# Patient Record
Sex: FEMALE | Race: WHITE | URBAN - METROPOLITAN AREA
[De-identification: names, ages, dates, MRNs, and addresses within clinical notes are randomized per-mention and may not be internally consistent; named-entity substitution may affect disease eponyms.]

---

## 2017-06-16 ENCOUNTER — OUTPATIENT (OUTPATIENT)
Dept: OUTPATIENT SERVICES | Facility: HOSPITAL | Age: 77
LOS: 1 days | Discharge: HOME | End: 2017-06-16

## 2017-06-28 DIAGNOSIS — Z01.818 ENCOUNTER FOR OTHER PREPROCEDURAL EXAMINATION: ICD-10-CM

## 2017-06-28 DIAGNOSIS — N84.0 POLYP OF CORPUS UTERI: ICD-10-CM

## 2017-06-30 ENCOUNTER — OUTPATIENT (OUTPATIENT)
Dept: OUTPATIENT SERVICES | Facility: HOSPITAL | Age: 77
LOS: 1 days | Discharge: HOME | End: 2017-06-30

## 2017-07-06 DIAGNOSIS — E78.00 PURE HYPERCHOLESTEROLEMIA, UNSPECIFIED: ICD-10-CM

## 2017-07-06 DIAGNOSIS — N84.0 POLYP OF CORPUS UTERI: ICD-10-CM

## 2017-07-06 DIAGNOSIS — N85.8 OTHER SPECIFIED NONINFLAMMATORY DISORDERS OF UTERUS: ICD-10-CM

## 2017-07-06 DIAGNOSIS — I10 ESSENTIAL (PRIMARY) HYPERTENSION: ICD-10-CM

## 2017-07-06 DIAGNOSIS — Z85.3 PERSONAL HISTORY OF MALIGNANT NEOPLASM OF BREAST: ICD-10-CM

## 2017-07-15 PROBLEM — Z00.00 ENCOUNTER FOR PREVENTIVE HEALTH EXAMINATION: Status: ACTIVE | Noted: 2017-07-15

## 2017-07-18 ENCOUNTER — OUTPATIENT (OUTPATIENT)
Dept: OUTPATIENT SERVICES | Facility: HOSPITAL | Age: 77
LOS: 1 days | Discharge: HOME | End: 2017-07-18

## 2017-07-18 DIAGNOSIS — R19.00 INTRA-ABDOMINAL AND PELVIC SWELLING, MASS AND LUMP, UNSPECIFIED SITE: ICD-10-CM

## 2017-07-21 ENCOUNTER — OUTPATIENT (OUTPATIENT)
Dept: OUTPATIENT SERVICES | Facility: HOSPITAL | Age: 77
LOS: 1 days | Discharge: HOME | End: 2017-07-21

## 2017-07-21 ENCOUNTER — APPOINTMENT (OUTPATIENT)
Dept: GYNECOLOGIC ONCOLOGY | Facility: CLINIC | Age: 77
End: 2017-07-21

## 2017-07-21 VITALS
RESPIRATION RATE: 14 BRPM | WEIGHT: 114 LBS | TEMPERATURE: 96.7 F | SYSTOLIC BLOOD PRESSURE: 157 MMHG | HEIGHT: 59 IN | DIASTOLIC BLOOD PRESSURE: 79 MMHG | BODY MASS INDEX: 22.98 KG/M2

## 2017-07-21 DIAGNOSIS — R93.5 ABNORMAL FINDINGS ON DIAGNOSTIC IMAGING OF OTHER ABDOMINAL REGIONS, INCLUDING RETROPERITONEUM: ICD-10-CM

## 2017-07-24 DIAGNOSIS — R93.8 ABNORMAL FINDINGS ON DIAGNOSTIC IMAGING OF OTHER SPECIFIED BODY STRUCTURES: ICD-10-CM

## 2017-10-17 ENCOUNTER — OUTPATIENT (OUTPATIENT)
Dept: OUTPATIENT SERVICES | Facility: HOSPITAL | Age: 77
LOS: 1 days | Discharge: HOME | End: 2017-10-17

## 2017-10-17 DIAGNOSIS — R19.00 INTRA-ABDOMINAL AND PELVIC SWELLING, MASS AND LUMP, UNSPECIFIED SITE: ICD-10-CM

## 2017-11-10 ENCOUNTER — APPOINTMENT (OUTPATIENT)
Dept: GYNECOLOGIC ONCOLOGY | Facility: CLINIC | Age: 77
End: 2017-11-10

## 2018-01-15 ENCOUNTER — OUTPATIENT (OUTPATIENT)
Dept: OUTPATIENT SERVICES | Facility: HOSPITAL | Age: 78
LOS: 1 days | Discharge: HOME | End: 2018-01-15

## 2018-01-15 DIAGNOSIS — R22.0 LOCALIZED SWELLING, MASS AND LUMP, HEAD: ICD-10-CM

## 2018-07-20 ENCOUNTER — EMERGENCY (EMERGENCY)
Facility: HOSPITAL | Age: 78
LOS: 0 days | Discharge: HOME | End: 2018-07-21
Admitting: SURGERY

## 2018-07-20 ENCOUNTER — INPATIENT (INPATIENT)
Facility: HOSPITAL | Age: 78
LOS: 0 days | Discharge: HOME | End: 2018-07-21
Attending: SURGERY | Admitting: SURGERY

## 2018-07-20 VITALS
TEMPERATURE: 97 F | SYSTOLIC BLOOD PRESSURE: 167 MMHG | RESPIRATION RATE: 18 BRPM | OXYGEN SATURATION: 97 % | HEART RATE: 98 BPM | DIASTOLIC BLOOD PRESSURE: 79 MMHG

## 2018-07-20 VITALS — WEIGHT: 115.96 LBS | HEIGHT: 59 IN

## 2018-07-20 DIAGNOSIS — Z90.10 ACQUIRED ABSENCE OF UNSPECIFIED BREAST AND NIPPLE: Chronic | ICD-10-CM
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
## 2018-07-20 DIAGNOSIS — S22.42XA MULTIPLE FRACTURES OF RIBS, LEFT SIDE, INITIAL ENCOUNTER FOR CLOSED FRACTURE: ICD-10-CM

## 2018-07-20 DIAGNOSIS — S01.81XA LACERATION WITHOUT FOREIGN BODY OF OTHER PART OF HEAD, INITIAL ENCOUNTER: ICD-10-CM

## 2018-07-20 DIAGNOSIS — Z85.3 PERSONAL HISTORY OF MALIGNANT NEOPLASM OF BREAST: ICD-10-CM

## 2018-07-20 DIAGNOSIS — I10 ESSENTIAL (PRIMARY) HYPERTENSION: ICD-10-CM
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
## 2018-07-20 DIAGNOSIS — M21.379 FOOT DROP, UNSPECIFIED FOOT: ICD-10-CM

## 2018-07-20 DIAGNOSIS — E78.00 PURE HYPERCHOLESTEROLEMIA, UNSPECIFIED: ICD-10-CM
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
## 2018-07-20 DIAGNOSIS — R07.82 INTERCOSTAL PAIN: ICD-10-CM

## 2018-07-20 LAB
ANION GAP SERPL CALC-SCNC: 18 MMOL/L — HIGH (ref 7–14)
BASOPHILS # BLD AUTO: 0.03 K/UL — SIGNIFICANT CHANGE UP (ref 0–0.2)
BASOPHILS NFR BLD AUTO: 0.3 % — SIGNIFICANT CHANGE UP (ref 0–1)
BUN SERPL-MCNC: 18 MG/DL — SIGNIFICANT CHANGE UP (ref 10–20)
CALCIUM SERPL-MCNC: 9.4 MG/DL — SIGNIFICANT CHANGE UP (ref 8.5–10.1)
CHLORIDE SERPL-SCNC: 99 MMOL/L — SIGNIFICANT CHANGE UP (ref 98–110)
CK MB CFR SERPL CALC: 19.5 NG/ML — HIGH (ref 0.6–6.3)
CK MB CFR SERPL CALC: 22.7 NG/ML — HIGH (ref 0.6–6.3)
CK SERPL-CCNC: 273 U/L — HIGH (ref 0–225)
CK SERPL-CCNC: 302 U/L — HIGH (ref 0–225)
CO2 SERPL-SCNC: 26 MMOL/L — SIGNIFICANT CHANGE UP (ref 17–32)
CREAT SERPL-MCNC: 0.8 MG/DL — SIGNIFICANT CHANGE UP (ref 0.7–1.5)
EOSINOPHIL # BLD AUTO: 0.01 K/UL — SIGNIFICANT CHANGE UP (ref 0–0.7)
EOSINOPHIL NFR BLD AUTO: 0.1 % — SIGNIFICANT CHANGE UP (ref 0–8)
GLUCOSE SERPL-MCNC: 93 MG/DL — SIGNIFICANT CHANGE UP (ref 70–99)
HCT VFR BLD CALC: 43.5 % — SIGNIFICANT CHANGE UP (ref 37–47)
HGB BLD-MCNC: 14.4 G/DL — SIGNIFICANT CHANGE UP (ref 12–16)
IMM GRANULOCYTES NFR BLD AUTO: 0.3 % — SIGNIFICANT CHANGE UP (ref 0.1–0.3)
LYMPHOCYTES # BLD AUTO: 0.74 K/UL — LOW (ref 1.2–3.4)
LYMPHOCYTES # BLD AUTO: 8.3 % — LOW (ref 20.5–51.1)
MCHC RBC-ENTMCNC: 30.6 PG — SIGNIFICANT CHANGE UP (ref 27–31)
MCHC RBC-ENTMCNC: 33.1 G/DL — SIGNIFICANT CHANGE UP (ref 32–37)
MCV RBC AUTO: 92.6 FL — SIGNIFICANT CHANGE UP (ref 81–99)
MONOCYTES # BLD AUTO: 0.51 K/UL — SIGNIFICANT CHANGE UP (ref 0.1–0.6)
MONOCYTES NFR BLD AUTO: 5.7 % — SIGNIFICANT CHANGE UP (ref 1.7–9.3)
NEUTROPHILS # BLD AUTO: 7.58 K/UL — HIGH (ref 1.4–6.5)
NEUTROPHILS NFR BLD AUTO: 85.3 % — HIGH (ref 42.2–75.2)
NRBC # BLD: 0 /100 WBCS — SIGNIFICANT CHANGE UP (ref 0–0)
PLATELET # BLD AUTO: 168 K/UL — SIGNIFICANT CHANGE UP (ref 130–400)
POTASSIUM SERPL-MCNC: 4.2 MMOL/L — SIGNIFICANT CHANGE UP (ref 3.5–5)
POTASSIUM SERPL-SCNC: 4.2 MMOL/L — SIGNIFICANT CHANGE UP (ref 3.5–5)
RBC # BLD: 4.7 M/UL — SIGNIFICANT CHANGE UP (ref 4.2–5.4)
RBC # FLD: 12.4 % — SIGNIFICANT CHANGE UP (ref 11.5–14.5)
SODIUM SERPL-SCNC: 143 MMOL/L — SIGNIFICANT CHANGE UP (ref 135–146)
TROPONIN T SERPL-MCNC: 0.02 NG/ML — HIGH
TROPONIN T SERPL-MCNC: <0.01 NG/ML — SIGNIFICANT CHANGE UP
WBC # BLD: 8.9 K/UL — SIGNIFICANT CHANGE UP (ref 4.8–10.8)
WBC # FLD AUTO: 8.9 K/UL — SIGNIFICANT CHANGE UP (ref 4.8–10.8)

## 2018-07-20 NOTE — ED ADULT TRIAGE NOTE - CHIEF COMPLAINT QUOTE
BIBA s/p trip and fall PTA. + laceration to left forehead, left knee abrasian No LOC, no blood thinners.

## 2018-07-20 NOTE — H&P ADULT - NSHPPHYSICALEXAM_GEN_ALL_CORE
T(C): 36.3 (20 Jul 2018 15:13), Max: 36.3 (20 Jul 2018 15:13)  T(F): 97.4 (20 Jul 2018 15:13), Max: 97.4 (20 Jul 2018 15:13)  HR: 98 (20 Jul 2018 15:13) (98 - 98)  BP: 167/79 (20 Jul 2018 15:13) (167/79 - 167/79)  RR: 18 (20 Jul 2018 15:13) (18 - 18)  SpO2: 97% (20 Jul 2018 15:13) (97% - 97%)    General: NAD, AAOx3, calm and cooperative, road rashes are persant on left knee, forehead  HEENT: NCAT, BOOKER, EOMI, Trachea ML  Cardiac: RRR S1, S2, no Murmurs, rubs or gallops  Respiratory: CTAB, normal respiratory effort, breath sounds equal BL, tendernesss present on l ant chest  Abdomen: Soft, non-distended, non-tender, +bowel sounds  Musculoskeletal: Strength 5/5 BL UE/LE, ROM intact, compartments soft  Neuro: Sensation grossly intact and equal throughout, CN II-XII intact, no focal deficits  Vascular: Pulses 2+ throughout, extremities well perfused  Skin: Warm/dry, normal color, no jaundice T(C): 36.3 (20 Jul 2018 15:13), Max: 36.3 (20 Jul 2018 15:13)  T(F): 97.4 (20 Jul 2018 15:13), Max: 97.4 (20 Jul 2018 15:13)  HR: 98 (20 Jul 2018 15:13) (98 - 98)  BP: 167/79 (20 Jul 2018 15:13) (167/79 - 167/79)  RR: 18 (20 Jul 2018 15:13) (18 - 18)  SpO2: 97% (20 Jul 2018 15:13) (97% - 97%)    General: NAD, AAOx3, calm and cooperative, road rashes are persant on left knee, forehead  HEENT: NCAT, BOOKER, EOMI, Trachea ML  Cardiac: RRR S1, S2, no Murmurs, rubs or gallops  Respiratory: CTAB, normal respiratory effort, breath sounds equal BL, tendernesss present on l ant chest  Abdomen: Soft, non-distended, non-tender, +bowel sounds  Musculoskeletal: Strength 5/5 BL UE, left foot drop  Neuro: Sensation grossly intact and equal throughout, CN II-XII intact, no focal deficits  Vascular: Pulses 2+ throughout, extremities well perfused  Skin: Warm/dry, normal color, no jaundice

## 2018-07-20 NOTE — ED PROVIDER NOTE - PROGRESS NOTE DETAILS
ekg abnl, labs added; trauma c/s called, resident in ED seeing pt  discussed this all with pt and family at bedside Signed over to Yaw Winslow rev'd lab results with pt, sts has seen  before for abnl ekg, last visit 2 yrs ago- unsure of w/u, dx; needs repeat CE in 4 hrs PT SIGNED OUT T ME BY DR. HENDERSON, FOLLOW UP CT SCANS, TRAUMA CONSULTATION, REASSESS AND DISPO. PT SIGNED OUT TO DR. DELA CRUZ, FOLLOW UP CT SCANS, TRAUMA CONSULTATION, REASSESS AND DISPO. pt was admitted to the trauma service for mult rib fx

## 2018-07-20 NOTE — ED PROVIDER NOTE - CARE PLAN
Principal Discharge DX:	Closed fracture of multiple ribs of left side, initial encounter  Secondary Diagnosis:	Fall, initial encounter  Secondary Diagnosis:	Facial laceration, initial encounter

## 2018-07-20 NOTE — ED ADULT NURSE REASSESSMENT NOTE - NS ED NURSE REASSESS COMMENT FT1
fracture of multiple ribs  noted in x-rays, pt to be placed for trauma consult . pt was updated by the ED PA of results and the plan of care at this time . Pt has no questions or concerns at this time
trauma at consult at bedside for pt assessment

## 2018-07-20 NOTE — ED ADULT NURSE NOTE - OBJECTIVE STATEMENT
pt presents with left sided chest pain s/p a mechanical fall to the ground. pt hit their head to the ground laceration noted to L forehead no active bleeding at this time, with left knee abrasion . pt denies loc or being on blood thinner

## 2018-07-20 NOTE — H&P ADULT - ASSESSMENT
ASSESSMENT:  77y Female s/p trip and fall with above-mentioned physical labs and imaging findings.    PLAN:   -NPO  -IS  -Pain control  -CT chest, abd and pelvis with IV contrast

## 2018-07-20 NOTE — ED PROVIDER NOTE - OBJECTIVE STATEMENT
pt s/p fall- trip/fall as witnessed by friend and bystanders who called ems  pt sts has dropped foot, uses cane to ambulate but does trip sometimes  pt sts did not lift foot high enough for curb as she crossed the street  fell, landing on left side sustaining abrasion to left hand, knee, forehead lac  c/o left sided chest/rib pain, no other sxs  ambulated at scene and in ER  no LOC, no anticoag  tdap not utd

## 2018-07-20 NOTE — H&P ADULT - HISTORY OF PRESENT ILLNESS
HPI: 77y Female with PMH of HTN, foot drop and treated breast cancer s/p fall- trip/fall with +HT, -LOC, -AC. She uses cane to ambulate but does trip sometimes pt sts did not lift foot high enough for curb as she crossed the street fell, landing on left side sustaining abrasion to left hand, knee, forehead lac. She also c/o of left sided pleuritic chest pain worsens on deep breath.

## 2018-07-20 NOTE — ED PROVIDER NOTE - ATTENDING CONTRIBUTION TO CARE
Seen with PA agree with above, Patient fell, abdomen- soft and non tender, minimal tenderness to chest wall, CT chest multiple rib fractures, Trauma team consulted will most likely needs admission

## 2018-07-20 NOTE — H&P ADULT - NSHPLABSRESULTS_GEN_ALL_CORE
Labs:  CAPILLARY BLOOD GLUCOSE                              14.4   8.90  )-----------( 168      ( 20 Jul 2018 17:07 )             43.5       Auto Immature Granulocyte %: 0.3 % (07-20-18 @ 17:07)  Auto Neutrophil %: 85.3 % (07-20-18 @ 17:07)    < from: CT Chest No Cont (07.20.18 @ 16:53) >    IMPRESSION:    1. Nearly nondisplaced fractures of the left anterior third through fifth   ribs.  2. Pulmonary nodules as above measuring up to 4 mm. In a high risk   patient, CT follow-up is recommended in 12 months. In a low risk patient,   no further follow-up is required.      < end of copied text >    < from: CT Head No Cont (07.20.18 @ 16:49) >    IMPRESSION:     1.  Mild periventricular and subcortical white matter chronic small   vessel ischemic changes.    2.  No acute fractures mass effect, midline shift or hemorrhage.        < end of copied text >

## 2018-07-20 NOTE — ED ADULT NURSE NOTE - CHPI ED SYMPTOMS NEG
no loss of consciousness/no tingling/no confusion/no bleeding/no weakness/no deformity/no numbness/no vomiting

## 2018-07-20 NOTE — ED PROVIDER NOTE - MEDICAL DECISION MAKING DETAILS
Trauma team to evaluate patient Trauma team to evaluate patient  pt admitted to trauma for mult rib fx.

## 2018-07-20 NOTE — ED PROVIDER NOTE - NS ED ROS FT
Constitutional: no fever, chills, no recent weight loss, change in appetite or malaise  Eyes: no redness/discharge/pain/vision changes  ENT: no rhinorrhea/ear pain/sore throat  Cardiac: No SOB or edema.  Respiratory: No cough or respiratory distress  GI: No nausea, vomiting, diarrhea or abdominal pain.  : No dysuria, frequency, urgency or hematuria  MS: no pain to back or extremities, no loss of ROM, no weakness  Neuro: No headache or weakness. No LOC.  Skin: lac and abrasions, No skin rash.  Endocrine: No history of thyroid disease or diabetes.  Except as documented in the HPI, all other systems are negative.

## 2018-07-21 RX ORDER — PANTOPRAZOLE SODIUM 20 MG/1
40 TABLET, DELAYED RELEASE ORAL
Qty: 0 | Refills: 0 | Status: DISCONTINUED | OUTPATIENT
Start: 2018-07-21 | End: 2018-07-21

## 2018-07-21 RX ORDER — METOPROLOL TARTRATE 50 MG
50 TABLET ORAL
Qty: 0 | Refills: 0 | Status: DISCONTINUED | OUTPATIENT
Start: 2018-07-21 | End: 2018-07-21

## 2018-07-21 RX ORDER — ACETAMINOPHEN 500 MG
650 TABLET ORAL EVERY 6 HOURS
Qty: 0 | Refills: 0 | Status: DISCONTINUED | OUTPATIENT
Start: 2018-07-21 | End: 2018-07-21

## 2018-07-21 RX ORDER — SIMVASTATIN 20 MG/1
10 TABLET, FILM COATED ORAL
Qty: 0 | Refills: 0 | COMMUNITY

## 2018-07-21 RX ORDER — SIMVASTATIN 20 MG/1
10 TABLET, FILM COATED ORAL AT BEDTIME
Qty: 0 | Refills: 0 | Status: DISCONTINUED | OUTPATIENT
Start: 2018-07-21 | End: 2018-07-21

## 2018-07-21 RX ORDER — SIMVASTATIN 20 MG/1
0 TABLET, FILM COATED ORAL
Qty: 0 | Refills: 0 | COMMUNITY

## 2018-07-21 RX ORDER — METOPROLOL TARTRATE 50 MG
0 TABLET ORAL
Qty: 0 | Refills: 0 | COMMUNITY

## 2018-07-21 RX ORDER — KETOROLAC TROMETHAMINE 30 MG/ML
15 SYRINGE (ML) INJECTION EVERY 6 HOURS
Qty: 0 | Refills: 0 | Status: DISCONTINUED | OUTPATIENT
Start: 2018-07-21 | End: 2018-07-21

## 2018-07-21 NOTE — CONSULT NOTE ADULT - SUBJECTIVE AND OBJECTIVE BOX
SICU Consultation Note  =====================================================  HPI: 77y Female  HPI:  HPI: 77y Female with PMH of HTN, foot drop and treated breast cancer s/p fall- trip/fall with +HT, -LOC, -AC. She uses cane to ambulate but does trip sometimes pt sts did not lift foot high enough for curb as she crossed the street fell, landing on left side sustaining abrasion to left hand, knee, forehead lac. She also c/o of left sided pleuritic chest pain worsens on deep breath. (20 Jul 2018 18:24)    PAST MEDICAL & SURGICAL HISTORY:  Hypercholesteremia  Breast cancer  HTN (hypertension)  H/O mastectomy    Home Meds: Home Medications:  metoprolol tartrate 100 mg oral tablet: 50 milligram(s) orally 2 times a day (21 Jul 2018 01:22)  simvastatin: 10  orally once (at bedtime) (21 Jul 2018 01:23)    Allergies: Allergies    No Known Allergies    Intolerances      Soc:   Advanced Directives: Presumed Full Code     ROS:    REVIEW OF SYSTEMS    [x] A ten-point review of systems was otherwise negative except as noted.  [ ] Due to altered mental status/intubation, subjective information were not able to be obtained from the patient. History was obtained, to the extent possible, from review of the chart and collateral sources of information.      CURRENT MEDICATIONS:   --------------------------------------------------------------------------------------  Neurologic Medications    Respiratory Medications    Cardiovascular Medications  metoprolol tartrate 50 milliGRAM(s) Oral two times a day    Gastrointestinal Medications    Genitourinary Medications    Hematologic/Oncologic Medications    Antimicrobial/Immunologic Medications    Endocrine/Metabolic Medications  simvastatin 10 milliGRAM(s) Oral at bedtime    Topical/Other Medications    --------------------------------------------------------------------------------------    VITAL SIGNS, INS/OUTS (last 24 hours):  --------------------------------------------------------------------------------------  ICU Vital Signs Last 24 Hrs  T(C): 36.3 (20 Jul 2018 15:13), Max: 36.3 (20 Jul 2018 15:13)  T(F): 97.4 (20 Jul 2018 15:13), Max: 97.4 (20 Jul 2018 15:13)  HR: 98 (20 Jul 2018 15:13) (98 - 98)  BP: 167/79 (20 Jul 2018 15:13) (167/79 - 167/79)  BP(mean): --  ABP: --  ABP(mean): --  RR: 18 (20 Jul 2018 15:13) (18 - 18)  SpO2: 97% (20 Jul 2018 15:13) (97% - 97%)    I&O's Summary    --------------------------------------------------------------------------------------    EXAM:  General/Neuro  RASS: 0  GCS: 15  Exam: Normal, NAD, alert, oriented x 3, no focal deficits. PERRLA   Respiratory  Exam: Lungs clear to auscultation, Normal expansion/effort.     Cardiovascular  Exam: S1, S2.  Regular rate and rhythm.  Peripheral edema. Left chest tender to palpation.     Cardiac Rhythm: Normal Sinus Rhythm  ECHO:     GI  Exam: Abdomen soft, Non-tender, Non-distended.      Tubes/Lines/Drains     [x] Peripheral IV  [] Central Venous Line     	[] R	[] L	[] IJ	[] Fem	[] SC        Type:	    Date Placed:   [] Arterial Line		[] R	[] L	[] Fem	[] Rad	[] Ax	Date Placed:   [] PICC:         	[] Midline		[] Mediport           [] Urinary Catheter		Date Placed:     Extremities  Exam: Extremities warm, pink, well-perfused.      Derm:  Exam: Good skin turgor, no skin breakdown.      :   Exam: Oglesyb catheter in place.     LABS  --------------------------------------------------------------------------------------  Labs:  CAPILLARY BLOOD GLUCOSE                          14.4   8.90  )-----------( 168      ( 20 Jul 2018 17:07 )             43.5       Auto Immature Granulocyte %: 0.3 % (07-20-18 @ 17:07)  Auto Neutrophil %: 85.3 % (07-20-18 @ 17:07)    07-20    143  |  99  |  18  ----------------------------<  93  4.2   |  26  |  0.8      Calcium, Total Serum: 9.4 mg/dL (07-20-18 @ 17:07)      LFTs:       Coags:    CARDIAC MARKERS ( 20 Jul 2018 20:53 )  x     / <0.01 ng/mL / 273 U/L / x     / 19.5 ng/mL  CARDIAC MARKERS ( 20 Jul 2018 17:07 )  x     / 0.02 ng/mL / 302 U/L / x     / 22.7 ng/mL      --------------------------------------------------------------------------------------    OTHER LABS    IMAGING RESULTS      ASSESSMENT:  77y Female with left anterior fracture of ribs 3-5.      PLAN:   Neurologic: Pain control with Tylenol/Motrin  Respiratory: Incentive spirometry  Cardiovascular: Continue home meds. Maintain normotension   Gastrointestinal/Nutrition: Regular diet  Renal/Genitourinary: Monitor UOP  Hematologic: SQH  Infectious Disease: No issues   Lines/Tubes: Peripheral IV  Endocrine: Continue simvastatin  Disposition: SICU    --------------------------------------------------------------------------------------    Critical Care Diagnoses:

## 2018-07-26 DIAGNOSIS — E78.00 PURE HYPERCHOLESTEROLEMIA, UNSPECIFIED: ICD-10-CM

## 2018-07-26 DIAGNOSIS — S22.42XA MULTIPLE FRACTURES OF RIBS, LEFT SIDE, INITIAL ENCOUNTER FOR CLOSED FRACTURE: ICD-10-CM

## 2018-07-26 DIAGNOSIS — S01.81XA LACERATION WITHOUT FOREIGN BODY OF OTHER PART OF HEAD, INITIAL ENCOUNTER: ICD-10-CM

## 2018-07-26 DIAGNOSIS — Z85.3 PERSONAL HISTORY OF MALIGNANT NEOPLASM OF BREAST: ICD-10-CM

## 2018-07-26 DIAGNOSIS — I10 ESSENTIAL (PRIMARY) HYPERTENSION: ICD-10-CM
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
## 2018-07-26 DIAGNOSIS — Z90.10 ACQUIRED ABSENCE OF UNSPECIFIED BREAST AND NIPPLE: ICD-10-CM
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
## 2018-12-31 PROBLEM — I10 ESSENTIAL (PRIMARY) HYPERTENSION: Chronic | Status: ACTIVE | Noted: 2018-07-20

## 2018-12-31 PROBLEM — E78.00 PURE HYPERCHOLESTEROLEMIA, UNSPECIFIED: Chronic | Status: ACTIVE | Noted: 2018-07-20

## 2019-01-04 ENCOUNTER — OUTPATIENT (OUTPATIENT)
Dept: OUTPATIENT SERVICES | Facility: HOSPITAL | Age: 79
LOS: 1 days | Discharge: HOME | End: 2019-01-04

## 2019-01-04 DIAGNOSIS — E04.2 NONTOXIC MULTINODULAR GOITER: ICD-10-CM

## 2019-01-04 DIAGNOSIS — Z90.10 ACQUIRED ABSENCE OF UNSPECIFIED BREAST AND NIPPLE: Chronic | ICD-10-CM

## 2019-01-30 NOTE — H&P ADULT - REASON FOR ADMISSION
s/p fall Javi Diaz)  Urology  [address], [facility]  [place], [place] [address]  Phone: [phone]  Fax: [phone]

## 2019-04-08 ENCOUNTER — FORM ENCOUNTER (OUTPATIENT)
Age: 79
End: 2019-04-08

## 2019-07-29 ENCOUNTER — OUTPATIENT (OUTPATIENT)
Dept: OUTPATIENT SERVICES | Facility: HOSPITAL | Age: 79
LOS: 1 days | Discharge: HOME | End: 2019-07-29
Payer: MEDICARE

## 2019-07-29 DIAGNOSIS — R91.1 SOLITARY PULMONARY NODULE: ICD-10-CM

## 2019-07-29 DIAGNOSIS — Z90.10 ACQUIRED ABSENCE OF UNSPECIFIED BREAST AND NIPPLE: Chronic | ICD-10-CM

## 2019-07-29 PROCEDURE — 71250 CT THORAX DX C-: CPT | Mod: 26

## 2020-01-07 ENCOUNTER — OUTPATIENT (OUTPATIENT)
Dept: OUTPATIENT SERVICES | Facility: HOSPITAL | Age: 80
LOS: 1 days | Discharge: HOME | End: 2020-01-07
Payer: MEDICARE

## 2020-01-07 DIAGNOSIS — E04.2 NONTOXIC MULTINODULAR GOITER: ICD-10-CM

## 2020-01-07 DIAGNOSIS — Z90.10 ACQUIRED ABSENCE OF UNSPECIFIED BREAST AND NIPPLE: Chronic | ICD-10-CM

## 2020-01-07 PROCEDURE — 76536 US EXAM OF HEAD AND NECK: CPT | Mod: 26

## 2020-08-11 ENCOUNTER — FORM ENCOUNTER (OUTPATIENT)
Age: 80
End: 2020-08-11

## 2021-03-04 ENCOUNTER — OUTPATIENT (OUTPATIENT)
Dept: OUTPATIENT SERVICES | Facility: HOSPITAL | Age: 81
LOS: 1 days | Discharge: HOME | End: 2021-03-04
Payer: MEDICARE

## 2021-03-04 DIAGNOSIS — E04.2 NONTOXIC MULTINODULAR GOITER: ICD-10-CM

## 2021-03-04 DIAGNOSIS — Z90.10 ACQUIRED ABSENCE OF UNSPECIFIED BREAST AND NIPPLE: Chronic | ICD-10-CM

## 2021-03-04 PROCEDURE — 76536 US EXAM OF HEAD AND NECK: CPT | Mod: 26

## 2021-07-26 ENCOUNTER — TRANSCRIPTION ENCOUNTER (OUTPATIENT)
Age: 81
End: 2021-07-26

## 2021-07-28 ENCOUNTER — TRANSCRIPTION ENCOUNTER (OUTPATIENT)
Age: 81
End: 2021-07-28

## 2022-01-05 ENCOUNTER — APPOINTMENT (OUTPATIENT)
Dept: OBGYN | Facility: CLINIC | Age: 82
End: 2022-01-05
Payer: MEDICARE

## 2022-01-05 VITALS
HEART RATE: 70 BPM | DIASTOLIC BLOOD PRESSURE: 86 MMHG | TEMPERATURE: 97.9 F | HEIGHT: 59 IN | SYSTOLIC BLOOD PRESSURE: 142 MMHG | WEIGHT: 116 LBS | BODY MASS INDEX: 23.39 KG/M2

## 2022-01-05 PROCEDURE — 76830 TRANSVAGINAL US NON-OB: CPT

## 2022-01-05 PROCEDURE — G0439: CPT

## 2022-01-05 NOTE — PHYSICAL EXAM
[Examination Of The Breasts] : a normal appearance [] : an implant [No Masses] : no breast masses were palpable [Vulvar Atrophy] : vulvar atrophy [Labia Majora] : normal [Labia Minora] : normal [Atrophy] : atrophy [Normal] : normal [Uterine Adnexae] : normal

## 2022-01-05 NOTE — HISTORY OF PRESENT ILLNESS
[FreeTextEntry1] : well woman visit\par and to check her endometrial lining\par \par last Scott VISIT\par Patient\par Name	CHAUNCEY GEE (80yo, F) ID# [id number]	Appt. Date/Time	2020 03:00PM\par 	1940	Service Dept.	[facility] OFFICE\par Provider	SHELL DE ANDA MD\par Insurance	\par Med Primary: MEDICARE-[facility] (MEDICARE)\par Insurance # : [id number]\par Med Secondary: GHI\par Insurance # : [id number]\par Policy/Group # : [id number]\par Med Tertiary: *SELF PAY*\par Prescription: [employer] - Member is eligible. details\par Chief Complaint\par gyn exam\par Patient's Care Team\par Primary Care Provider: EZIO BRIZUELA MD: [address] [place] [address], Ph [phone] NPI: [id number]\par Patient's Pharmacies\par THE RX DEPARTMENT ([facility]): [address] [place] [id number], Ph [phone], Fax [phone]\par Vitals\par Ht:	4 ft 11 in 2020 03:12 pm\par Wt:	116 lbs 2020 03:12 pm\par BMI:	23.4 2020 03:12 pm\par BP:	122/78 sitting 2020 03:13 pm\par T:	97.3 F° 2020 03:13 pm\par Allergies\par Reviewed Allergies\par NKDA\par Medications\par Reviewed Medications\par Calcium 500\par 17   entered	Jyotia Sonu\par ciclopirox 0.77 % topical cream\par 19   filled	surescripts\par ciclopirox 0.77 % topical gel\par 20   filled	surescripts\par CICLOPIROX OLAMINE 0.77 % CREA\par 08/15/18   filled	[employer]\par erythromycin 5 mg/gram (0.5 %) eye ointment\par 10/14/19   filled	surescripts\par glucosamin-chondroitn-collagen-hyalur ac 375 mg-300 mg-175 mg-2 mg cap\par Take by oral route.\par 17   entered	Sawyer Ascencio\par metoprolol succinate\par 17   entered	Sawyer Ascencio\par metoprolol tartrate 100 mg tablet\par 20   filled	surescripts\par METOPROLOL TARTRATE 100 MG TABS\par 18   filled	[employer]\par METOPROLOL TARTRATE 50 MG TABS\par 18   filled	[employer]\par METRONIDAZOLE 0.75 % GEL\par 18   filled	[employer]\par metroNIDAZOLE 0.75 % topical gel\par 18   filled	[employer]\par simvastatin 10 mg tablet\par Take 1 tablet(s) every day by oral route.\par 20   filled	surescripts\par SIMVASTATIN 10 MG TABS\par 18   filled	[employer]\par Vitamin C\par 17   entered	Sawyer Ascencio\par Problems\par Reviewed Problems\par Family History\par Reviewed Family History\par Father	- Malignant tumor of colon\par Mother	- Malignant tumor of colon\par  	- Malignant tumor of breast\par Social History\par Reviewed Social History\par Tobacco Smoking Status: Never smoker\par Most Recent Tobacco Use Screenin2020\par Surgical History\par Surgical History not reviewed (last reviewed 2019)\par Mastectomy - right\par Hysteroscopy, surgical, with biopsy of endometrium and/or polypectomy (surg) - 2017\par GYN History\par Reviewed GYN History\par LMP: Definite.\par Date of LMP: (Notes: 51 y/old).\par Obstetric History\par Reviewed Obstetric History\par TOTAL	FULL	PRE	AB. I	AB. S	ECTOPICS	MULTIPLE	LIVING\par 0							\par Past Medical History\par Past Medical History not reviewed (last reviewed 2019)\par Breast Cancer: Y -  sx/chemo/radiation\par Cancer: Y - basal cell: nose\par Heart Conditions: Y - cholesterol\par High Blood Pressure: Y\par Screening\par None recorded.\par HPI\par routine gyn exam\par ROS\par Patient reports no fatigue, no fever, no significant weight gain, and no significant weight loss. She reports no abnormal moles and no rashes. She reports no irritation and no vision changes. She reports no hearing loss, no ear pain, no nose/sinus problems, no sore throat, no snoring, no dry mouth, and no mouth ulcers. She reports no dyspnea / shortness of breath, no cough, no sputum production, no hemoptysis, and no wheezing. She reports no chest pain, no palpitations, and no orthopnea. She reports no heartburn, no dysphagia, no nausea, no vomiting, no abdominal pain, no bowel movement changes, no diarrhea, no constipation, and no rectal bleeding. She reports no hematuria, no abnormal bleeding, no flank pain, no trouble urinating, no incontinence, no rash, no lesion, no discharge, no vaginal odor, and no vaginal itching. She reports no menstrual problems and no PMDD symptoms. She reports no menopausal symptoms. She reports no sexual problems. She reports no muscle aches, no muscle weakness, no arthralgias/joint pain, and no back pain. She reports no headaches, no dizziness, no LOC, no weakness, no numbness, and no seizures. She reports no depression, no alcoholism, and no sleep disturbances.\par Physical Exam\par Patient is a 79-year-old female.\par \par Chaperone: Chaperone: present.\par \par Female Genitalia: Vulva: no masses, atrophy, or lesions. Bladder/Urethra: no urethral discharge or mass and normal meatus and bladder non distended. Vagina no tenderness, erythema, cystocele, rectocele, abnormal vaginal discharge, or vesicle(s) or ulcers. Cervix: no discharge or cervical motion tenderness and grossly normal. Uterus: normal size and shape and midline, mobile, non-tender, and no uterine prolapse. Adnexa/Parametria: no parametrial tenderness or mass and no adnexal tenderness or ovarian mass.\par \par Breast: Inspection/Palpation: no erythema, induration, tenderness, skin changes, abnormal secretions, or distinct masses and normal nipple appearance and non tender axillary lymph nodes.\par \par Abdomen: Auscultation/Inspection/Palpation: no tenderness, hepatomegaly, splenomegaly, masses, or CVA tenderness and soft, non-distended, and normal bowel sounds. Hernia: none palpated.\par Assessment / Plan\par 1. Polyp of corpus uteri -\par SMALLER again\par \par observe as per Collado\par \par 6 MTHS\par \par N84.0: Polyp of corpus uteri\par US, TRANSVAGINAL\par \par 2. Cyst of left ovary -\par tiny...SMALLER again\par \par ca125 neg\par \par repeat 1 year\par \par N83.202: Unspecified ovarian cyst, left side\par US, TRANSVAGINAL\par \par 3. Capsular breast contracture of breast implant -\par plastic surgery consult\par \par T85.44XA: Capsular contracture of breast implant, initial encounter\par \par 4. Personal history of primary malignant neoplasm of breast -\par right\par \par implant\par \par Z85.3: Personal history of malignant neoplasm of breast\par \par US, TRANSVAGINAL\par \par Review of us, transvaginal taken on 2020 at [facility] SI OFFICE shows:\par Imaging Studies:\par Uterus: present, anteverted, size (cm): 9.1.\par Endometrium: thickness 3.6 mm.\par Cervix: normal.\par Cul de sac: no fluid was demonstrated.\par Right Ovary: volume (cc): 0.4.\par Left Ovary: volume (cc): 0.5 and left ovarian cysts (0.1cc).\par \par Return to Office\par None recorded.\par Encounter Sign-Off\par Encounter signed-off by Shell De Anda MD, 2020.\par Encounter performed and documented by Shell De Anda MD\par Encounter reviewed & signed by Shell De Anda MD on 2020 at 3:35pm

## 2022-01-05 NOTE — PROCEDURE
[Transvaginal Ultrasound] : transvaginal ultrasound [FreeTextEntry3] : all ok\par cervicx normal\par lining unchanged\par tiny polyp, never grew\par no freefluid [FreeTextEntry5] : 11.4cc vol,  lining2.3mm [FreeTextEntry7] : 0.5cc [FreeTextEntry8] : 0.4cc

## 2022-01-05 NOTE — HISTORY OF PRESENT ILLNESS
[FreeTextEntry1] : well woman visit\par and to check her endometrial lining\par \par last Scott VISIT\par Patient\par Name	CHAUNCEY GEE (78yo, F) ID# [id number]	Appt. Date/Time	2020 03:00PM\par 	1940	Service Dept.	[facility]\par Provider	SHELL DE ANDA MD\par Insurance	\par Med Primary: MEDICARE-[facility] (MEDICARE)\par Insurance # : [id number]\par Med Secondary: GHI\par Insurance # : [id number]\par Policy/Group # : [id number]\par Med Tertiary: *SELF PAY*\par Prescription: [employer] - Member is eligible. details\par Chief Complaint\par gyn exam\par Patient's Care Team\par Primary Care Provider: EZIO BRIZUELA MD: [address] [place] [address], Ph [phone] NPI: [id number]\par Patient's Pharmacies\par THE RX DEPARTMENT ([facility]): [address] [place] [id number], Ph [phone], Fax [phone]\par Vitals\par Ht:	4 ft 11 in 2020 03:12 pm\par Wt:	116 lbs 2020 03:12 pm\par BMI:	23.4 2020 03:12 pm\par BP:	122/78 sitting 2020 03:13 pm\par T:	97.3 F° 2020 03:13 pm\par Allergies\par Reviewed Allergies\par NKDA\par Medications\par Reviewed Medications\par Calcium 500\par 17   entered	Jyotia Sonu\par ciclopirox 0.77 % topical cream\par 19   filled	surescripts\par ciclopirox 0.77 % topical gel\par 20   filled	surescripts\par CICLOPIROX OLAMINE 0.77 % CREA\par 08/15/18   filled	PRESCRIPTION [employer]UTIONS\par erythromycin 5 mg/gram (0.5 %) eye ointment\par 10/14/19   filled	surescripts\par glucosamin-chondroitn-collagen-hyalur ac 375 mg-300 mg-175 mg-2 mg cap\par Take by oral route.\par 17   entered	Sawyer Ascencio\par metoprolol succinate\par 17   entered	Sawyer Ascencio\par metoprolol tartrate 100 mg tablet\par 20   filled	surescripts\par METOPROLOL TARTRATE 100 MG TABS\par 18   filled	PRES[employer]IONS\par METOPROLOL TARTRATE 50 MG TABS\par 18   filled	[employer]\par METRONIDAZOLE 0.75 % GEL\par 18   filled	[employer]IONS\par metroNIDAZOLE 0.75 % topical gel\par 18   filled	PRESCRIPTION [employer]IONS\par simvastatin 10 mg tablet\par Take 1 tablet(s) every day by oral route.\par 20   filled	surescripts\par SIMVASTATIN 10 MG TABS\par 18   filled	PRESCRIPTION [employer]IONS\par Vitamin C\par 17   entered	Sawyer Ascencio\par Problems\par Reviewed Problems\par Family History\par Reviewed Family History\par Father	- Malignant tumor of colon\par Mother	- Malignant tumor of colon\par  	- Malignant tumor of breast\par Social History\par Reviewed Social History\par Tobacco Smoking Status: Never smoker\par Most Recent Tobacco Use Screenin2020\par Surgical History\par Surgical History not reviewed (last reviewed 2019)\par Mastectomy - right\par Hysteroscopy, surgical, with biopsy of endometrium and/or polypectomy (surg) - 2017\par GYN History\par Reviewed GYN History\par LMP: Definite.\par Date of LMP: (Notes: 51 y/old).\par Obstetric History\par Reviewed Obstetric History\par TOTAL	FULL	PRE	AB. I	AB. S	ECTOPICS	MULTIPLE	LIVING\par 0							\par Past Medical History\par Past Medical History not reviewed (last reviewed 2019)\par Breast Cancer: Y -  sx/chemo/radiation\par Cancer: Y - basal cell: nose\par Heart Conditions: Y - cholesterol\par High Blood Pressure: Y\par Screening\par None recorded.\par HPI\par routine gyn exam\par ROS\par Patient reports no fatigue, no fever, no significant weight gain, and no significant weight loss. She reports no abnormal moles and no rashes. She reports no irritation and no vision changes. She reports no hearing loss, no ear pain, no nose/sinus problems, no sore throat, no snoring, no dry mouth, and no mouth ulcers. She reports no dyspnea / shortness of breath, no cough, no sputum production, no hemoptysis, and no wheezing. She reports no chest pain, no palpitations, and no orthopnea. She reports no heartburn, no dysphagia, no nausea, no vomiting, no abdominal pain, no bowel movement changes, no diarrhea, no constipation, and no rectal bleeding. She reports no hematuria, no abnormal bleeding, no flank pain, no trouble urinating, no incontinence, no rash, no lesion, no discharge, no vaginal odor, and no vaginal itching. She reports no menstrual problems and no PMDD symptoms. She reports no menopausal symptoms. She reports no sexual problems. She reports no muscle aches, no muscle weakness, no arthralgias/joint pain, and no back pain. She reports no headaches, no dizziness, no LOC, no weakness, no numbness, and no seizures. She reports no depression, no alcoholism, and no sleep disturbances.\par Physical Exam\par Patient is a 79-year-old female.\par \par Chaperone: Chaperone: present.\par \par Female Genitalia: Vulva: no masses, atrophy, or lesions. Bladder/Urethra: no urethral discharge or mass and normal meatus and bladder non distended. Vagina no tenderness, erythema, cystocele, rectocele, abnormal vaginal discharge, or vesicle(s) or ulcers. Cervix: no discharge or cervical motion tenderness and grossly normal. Uterus: normal size and shape and midline, mobile, non-tender, and no uterine prolapse. Adnexa/Parametria: no parametrial tenderness or mass and no adnexal tenderness or ovarian mass.\par \par Breast: Inspection/Palpation: no erythema, induration, tenderness, skin changes, abnormal secretions, or distinct masses and normal nipple appearance and non tender axillary lymph nodes.\par \par Abdomen: Auscultation/Inspection/Palpation: no tenderness, hepatomegaly, splenomegaly, masses, or CVA tenderness and soft, non-distended, and normal bowel sounds. Hernia: none palpated.\par Assessment / Plan\par 1. Polyp of corpus uteri -\par SMALLER again\par \par observe as per Collado\par \par 6 MTHS\par \par N84.0: Polyp of corpus uteri\par US, TRANSVAGINAL\par \par 2. Cyst of left ovary -\par tiny...SMALLER again\par \par ca125 neg\par \par repeat 1 year\par \par N83.202: Unspecified ovarian cyst, left side\par US, TRANSVAGINAL\par \par 3. Capsular breast contracture of breast implant -\par plastic surgery consult\par \par T85.44XA: Capsular contracture of breast implant, initial encounter\par \par 4. Personal history of primary malignant neoplasm of breast -\par right\par \par implant\par \par Z85.3: Personal history of malignant neoplasm of breast\par \par US, TRANSVAGINAL\par \par Review of us, transvaginal taken on 2020 at [facility] shows:\par Imaging Studies:\par Uterus: present, anteverted, size (cm): 9.1.\par Endometrium: thickness 3.6 mm.\par Cervix: normal.\par Cul de sac: no fluid was demonstrated.\par Right Ovary: volume (cc): 0.4.\par Left Ovary: volume (cc): 0.5 and left ovarian cysts (0.1cc).\par \par Return to Office\par None recorded.\par Encounter Sign-Off\par Encounter signed-off by Shell De Anda MD, 2020.\par Encounter performed and documented by Shell De Anda MD\par Encounter reviewed & signed by Shell De Anda MD on 2020 at 3:35pm

## 2022-01-08 LAB — HPV HIGH+LOW RISK DNA PNL CVX: NOT DETECTED

## 2022-01-12 LAB — CYTOLOGY CVX/VAG DOC THIN PREP: NORMAL

## 2022-05-06 ENCOUNTER — OUTPATIENT (OUTPATIENT)
Dept: OUTPATIENT SERVICES | Facility: HOSPITAL | Age: 82
LOS: 1 days | Discharge: HOME | End: 2022-05-06
Payer: MEDICARE

## 2022-05-06 DIAGNOSIS — Z90.10 ACQUIRED ABSENCE OF UNSPECIFIED BREAST AND NIPPLE: Chronic | ICD-10-CM

## 2022-05-06 DIAGNOSIS — E04.1 NONTOXIC SINGLE THYROID NODULE: ICD-10-CM

## 2022-05-06 PROCEDURE — 76536 US EXAM OF HEAD AND NECK: CPT | Mod: 26

## 2022-05-11 ENCOUNTER — NON-APPOINTMENT (OUTPATIENT)
Age: 82
End: 2022-05-11

## 2022-05-12 ENCOUNTER — TRANSCRIPTION ENCOUNTER (OUTPATIENT)
Age: 82
End: 2022-05-12

## 2022-05-17 ENCOUNTER — APPOINTMENT (OUTPATIENT)
Age: 82
End: 2022-05-17

## 2023-01-11 ENCOUNTER — APPOINTMENT (OUTPATIENT)
Dept: OBGYN | Facility: CLINIC | Age: 83
End: 2023-01-11
Payer: MEDICARE

## 2023-01-11 VITALS
BODY MASS INDEX: 23.39 KG/M2 | HEIGHT: 59 IN | HEART RATE: 66 BPM | DIASTOLIC BLOOD PRESSURE: 74 MMHG | WEIGHT: 116 LBS | SYSTOLIC BLOOD PRESSURE: 147 MMHG

## 2023-01-11 LAB
BILIRUB UR QL STRIP: NEGATIVE
CLARITY UR: CLEAR
COLLECTION METHOD: NORMAL
GLUCOSE UR-MCNC: NEGATIVE
HCG UR QL: 0.2 EU/DL
HGB UR QL STRIP.AUTO: NORMAL
KETONES UR-MCNC: NEGATIVE
LEUKOCYTE ESTERASE UR QL STRIP: NEGATIVE
NITRITE UR QL STRIP: NEGATIVE
PH UR STRIP: 6
PROT UR STRIP-MCNC: NEGATIVE
SP GR UR STRIP: 1.02

## 2023-01-11 PROCEDURE — 76830 TRANSVAGINAL US NON-OB: CPT

## 2023-01-11 PROCEDURE — 81003 URINALYSIS AUTO W/O SCOPE: CPT | Mod: QW

## 2023-01-11 PROCEDURE — 99213 OFFICE O/P EST LOW 20 MIN: CPT | Mod: 25

## 2023-01-11 NOTE — HISTORY OF PRESENT ILLNESS
[FreeTextEntry1] : here to fu her inaccessible endometrial polyp, which has been stable\par inability to dilate her cervix and the small size has warranted observation

## 2023-01-11 NOTE — PHYSICAL EXAM
[Lt > Rt] : the left breast was larger than the right [No Discharge] : no discharge [] : an implant [___] : a [unfilled] ~Ucm mastectomy scar [Breast Reconstruction Right] : breast reconstruction [The Left Breast Was Examined] : a normal appearance [Breast Nipple Inversion Left] : inverted [No Masses] : no breast masses were palpable

## 2023-01-11 NOTE — PROCEDURE
[Transvaginal Ultrasound] : transvaginal ultrasound [FreeTextEntry3] : polyp essentially unchanged\par reviewed old scans and films\par cervix normal\par no free fluid\par uterus tiny\par no cysts [FreeTextEntry5] : 8.0 cc,   4mm thick at superior endometrium [FreeTextEntry7] : 0.5 cc [FreeTextEntry8] : 0.3 cc

## 2023-06-19 ENCOUNTER — OUTPATIENT (OUTPATIENT)
Dept: OUTPATIENT SERVICES | Facility: HOSPITAL | Age: 83
LOS: 1 days | End: 2023-06-19
Payer: MEDICARE

## 2023-06-19 DIAGNOSIS — Z00.8 ENCOUNTER FOR OTHER GENERAL EXAMINATION: ICD-10-CM

## 2023-06-19 DIAGNOSIS — E04.1 NONTOXIC SINGLE THYROID NODULE: ICD-10-CM

## 2023-06-19 DIAGNOSIS — Z90.10 ACQUIRED ABSENCE OF UNSPECIFIED BREAST AND NIPPLE: Chronic | ICD-10-CM

## 2023-06-19 PROCEDURE — 76536 US EXAM OF HEAD AND NECK: CPT

## 2023-06-19 PROCEDURE — 76536 US EXAM OF HEAD AND NECK: CPT | Mod: 26

## 2023-06-20 DIAGNOSIS — E04.1 NONTOXIC SINGLE THYROID NODULE: ICD-10-CM

## 2024-01-12 ENCOUNTER — APPOINTMENT (OUTPATIENT)
Dept: OBGYN | Facility: CLINIC | Age: 84
End: 2024-01-12
Payer: MEDICARE

## 2024-01-12 VITALS
SYSTOLIC BLOOD PRESSURE: 148 MMHG | BODY MASS INDEX: 23.99 KG/M2 | HEIGHT: 59 IN | WEIGHT: 119 LBS | HEART RATE: 65 BPM | DIASTOLIC BLOOD PRESSURE: 81 MMHG

## 2024-01-12 DIAGNOSIS — N85.9 NONINFLAMMATORY DISORDER OF UTERUS, UNSPECIFIED: ICD-10-CM

## 2024-01-12 DIAGNOSIS — Z01.419 ENCOUNTER FOR GYNECOLOGICAL EXAMINATION (GENERAL) (ROUTINE) W/OUT ABNORMAL FINDINGS: ICD-10-CM

## 2024-01-12 LAB
BILIRUB UR QL STRIP: NEGATIVE
CLARITY UR: CLEAR
COLLECTION METHOD: NORMAL
GLUCOSE UR-MCNC: NEGATIVE
HCG UR QL: 0.2 EU/DL
HGB UR QL STRIP.AUTO: NEGATIVE
KETONES UR-MCNC: NEGATIVE
LEUKOCYTE ESTERASE UR QL STRIP: NEGATIVE
NITRITE UR QL STRIP: NEGATIVE
PH UR STRIP: 6
PROT UR STRIP-MCNC: NEGATIVE
SP GR UR STRIP: 1.01

## 2024-01-12 PROCEDURE — 99212 OFFICE O/P EST SF 10 MIN: CPT

## 2024-01-12 PROCEDURE — 81003 URINALYSIS AUTO W/O SCOPE: CPT | Mod: QW

## 2024-01-12 PROCEDURE — G0438: CPT

## 2024-01-12 PROCEDURE — 76830 TRANSVAGINAL US NON-OB: CPT

## 2024-01-12 PROCEDURE — G0439: CPT

## 2024-01-12 RX ORDER — METOPROLOL TARTRATE 100 MG/1
100 TABLET, FILM COATED ORAL
Refills: 0 | Status: ACTIVE | COMMUNITY

## 2024-01-12 RX ORDER — SIMVASTATIN 10 MG/1
10 TABLET, FILM COATED ORAL
Refills: 0 | Status: ACTIVE | COMMUNITY

## 2024-01-12 NOTE — PROCEDURE
[Transvaginal Ultrasound] : transvaginal ultrasound [FreeTextEntry3] : polyp smaller [FreeTextEntry5] : 7.7 cc vol, polyp 3 mm trace circ: 2.49 cm trace area 0.3 cm

## 2024-01-12 NOTE — PHYSICAL EXAM
[Chaperone Present] : A chaperone was present in the examining room during all aspects of the physical examination [Lt > Rt] : the left breast was larger than the right [No Discharge] : no discharge [] : an implant [___] : a [unfilled] ~Ucm mastectomy scar [Breast Reconstruction Right] : breast reconstruction [The Left Breast Was Examined] : a normal appearance [Breast Nipple Inversion Left] : inverted [No Masses] : no breast masses were palpable [Vulvar Atrophy] : vulvar atrophy [Labia Majora] : normal [Labia Minora] : normal [Atrophy] : atrophy [Normal] : normal [Uterine Adnexae] : normal

## 2024-01-19 LAB
CYTOLOGY CVX/VAG DOC THIN PREP: NORMAL
HPV HIGH+LOW RISK DNA PNL CVX: NOT DETECTED

## 2024-01-22 DIAGNOSIS — Z12.39 ENCOUNTER FOR OTHER SCREENING FOR MALIGNANT NEOPLASM OF BREAST: ICD-10-CM

## 2024-06-20 ENCOUNTER — OUTPATIENT (OUTPATIENT)
Dept: OUTPATIENT SERVICES | Facility: HOSPITAL | Age: 84
LOS: 1 days | Discharge: HOME | End: 2024-06-20
Payer: MEDICARE

## 2024-06-20 VITALS — RESPIRATION RATE: 16 BRPM | DIASTOLIC BLOOD PRESSURE: 70 MMHG | SYSTOLIC BLOOD PRESSURE: 179 MMHG | HEART RATE: 63 BPM

## 2024-06-20 VITALS
RESPIRATION RATE: 16 BRPM | HEIGHT: 59 IN | DIASTOLIC BLOOD PRESSURE: 75 MMHG | OXYGEN SATURATION: 100 % | HEART RATE: 66 BPM | TEMPERATURE: 98 F | SYSTOLIC BLOOD PRESSURE: 190 MMHG | WEIGHT: 117.07 LBS

## 2024-06-20 DIAGNOSIS — Z90.10 ACQUIRED ABSENCE OF UNSPECIFIED BREAST AND NIPPLE: Chronic | ICD-10-CM

## 2024-06-20 DIAGNOSIS — H25.12 AGE-RELATED NUCLEAR CATARACT, LEFT EYE: ICD-10-CM

## 2024-06-20 PROCEDURE — V2632: CPT

## 2024-06-20 NOTE — ASU PATIENT PROFILE, ADULT - NSICDXPASTSURGICALHX_GEN_ALL_CORE_FT
PAST SURGICAL HISTORY:  H/O mastectomy      PAST SURGICAL HISTORY:  H/O mastectomy Right arm precaution

## 2024-06-20 NOTE — ASU PATIENT PROFILE, ADULT - FALL HARM RISK - HARM RISK INTERVENTIONS

## 2024-06-20 NOTE — ASU PATIENT PROFILE, ADULT - AS SC BRADEN SENSORY
01/01/24 2255   NIV Type   NIV Started/Stopped On   Equipment Type v60   Mode Bilevel   Mask Type Full face mask   Mask Size Large   Bonnet size Large   Assessment   Respirations 27   SpO2 96 %   Level of Consciousness 0   Comfort Level Good   Using Accessory Muscles No   Mask Compliance Good   Skin Protection for O2 Device N/A   Settings/Measurements   PIP Observed 16 cm H20   IPAP 15 cmH20   CPAP/EPAP 5 cmH2O   Vt (Measured) 1267 mL   Rate Ordered 12   FiO2  35 %   I Time/ I Time % 1 s   Minute Volume (L/min) 28.4 Liters   Mask Leak (lpm) 30 lpm   Patient's Home Machine No   Alarm Settings   Alarms On Y   Low Pressure (cmH2O) 5 cmH2O   High Pressure (cmH2O) 30 cmH2O   Delay Alarm 20 sec(s)   Apnea (secs) 20 secs   RR Low (bpm) 8   RR High (bpm) 40 br/min   Patient Observation   Observations Pt is resting in bed        (4) no impairment

## 2024-06-20 NOTE — ASU PATIENT PROFILE, ADULT - NSICDXPASTMEDICALHX_GEN_ALL_CORE_FT
PAST MEDICAL HISTORY:  Breast cancer     HTN (hypertension)     Hypercholesteremia      PAST MEDICAL HISTORY:  Breast cancer Right    HTN (hypertension)     Hypercholesteremia

## 2024-06-24 DIAGNOSIS — H26.8 OTHER SPECIFIED CATARACT: ICD-10-CM

## 2024-06-24 DIAGNOSIS — E78.00 PURE HYPERCHOLESTEROLEMIA, UNSPECIFIED: ICD-10-CM

## 2024-06-24 DIAGNOSIS — Z85.3 PERSONAL HISTORY OF MALIGNANT NEOPLASM OF BREAST: ICD-10-CM

## 2024-06-24 DIAGNOSIS — I10 ESSENTIAL (PRIMARY) HYPERTENSION: ICD-10-CM

## 2024-06-25 PROBLEM — C50.919 MALIGNANT NEOPLASM OF UNSPECIFIED SITE OF UNSPECIFIED FEMALE BREAST: Chronic | Status: ACTIVE | Noted: 2018-07-20

## 2024-06-27 ENCOUNTER — TRANSCRIPTION ENCOUNTER (OUTPATIENT)
Age: 84
End: 2024-06-27

## 2024-06-27 ENCOUNTER — OUTPATIENT (OUTPATIENT)
Dept: OUTPATIENT SERVICES | Facility: HOSPITAL | Age: 84
LOS: 1 days | Discharge: HOME | End: 2024-06-27
Payer: MEDICARE

## 2024-06-27 VITALS
DIASTOLIC BLOOD PRESSURE: 78 MMHG | RESPIRATION RATE: 18 BRPM | SYSTOLIC BLOOD PRESSURE: 172 MMHG | HEART RATE: 57 BPM | TEMPERATURE: 98 F | WEIGHT: 115.08 LBS | OXYGEN SATURATION: 96 % | HEIGHT: 59 IN

## 2024-06-27 VITALS — SYSTOLIC BLOOD PRESSURE: 168 MMHG | HEART RATE: 57 BPM | RESPIRATION RATE: 16 BRPM | DIASTOLIC BLOOD PRESSURE: 66 MMHG

## 2024-06-27 DIAGNOSIS — Z90.10 ACQUIRED ABSENCE OF UNSPECIFIED BREAST AND NIPPLE: Chronic | ICD-10-CM

## 2024-06-27 DIAGNOSIS — Z98.890 OTHER SPECIFIED POSTPROCEDURAL STATES: Chronic | ICD-10-CM

## 2024-06-27 DIAGNOSIS — H25.11 AGE-RELATED NUCLEAR CATARACT, RIGHT EYE: ICD-10-CM

## 2024-06-27 PROCEDURE — V2632: CPT

## 2024-06-27 RX ORDER — METOPROLOL TARTRATE 50 MG
50 TABLET ORAL
Qty: 0 | Refills: 0 | DISCHARGE

## 2024-06-27 RX ORDER — SIMVASTATIN 20 MG/1
10 TABLET, FILM COATED ORAL
Qty: 0 | Refills: 0 | DISCHARGE

## 2024-07-04 DIAGNOSIS — I10 ESSENTIAL (PRIMARY) HYPERTENSION: ICD-10-CM

## 2024-07-04 DIAGNOSIS — H26.8 OTHER SPECIFIED CATARACT: ICD-10-CM

## 2024-07-04 DIAGNOSIS — Z98.42 CATARACT EXTRACTION STATUS, LEFT EYE: ICD-10-CM

## 2024-07-04 DIAGNOSIS — E78.00 PURE HYPERCHOLESTEROLEMIA, UNSPECIFIED: ICD-10-CM

## 2024-07-04 DIAGNOSIS — Z85.3 PERSONAL HISTORY OF MALIGNANT NEOPLASM OF BREAST: ICD-10-CM

## 2024-07-04 DIAGNOSIS — Z96.1 PRESENCE OF INTRAOCULAR LENS: ICD-10-CM

## 2024-07-10 ENCOUNTER — OUTPATIENT (OUTPATIENT)
Dept: OUTPATIENT SERVICES | Facility: HOSPITAL | Age: 84
LOS: 1 days | End: 2024-07-10
Payer: MEDICARE

## 2024-07-10 DIAGNOSIS — Z90.10 ACQUIRED ABSENCE OF UNSPECIFIED BREAST AND NIPPLE: Chronic | ICD-10-CM

## 2024-07-10 DIAGNOSIS — Z00.8 ENCOUNTER FOR OTHER GENERAL EXAMINATION: ICD-10-CM

## 2024-07-10 DIAGNOSIS — E04.2 NONTOXIC MULTINODULAR GOITER: ICD-10-CM

## 2024-07-10 DIAGNOSIS — Z98.890 OTHER SPECIFIED POSTPROCEDURAL STATES: Chronic | ICD-10-CM

## 2024-07-10 PROCEDURE — 76536 US EXAM OF HEAD AND NECK: CPT | Mod: 26

## 2024-07-10 PROCEDURE — 76536 US EXAM OF HEAD AND NECK: CPT

## 2024-07-11 DIAGNOSIS — E04.2 NONTOXIC MULTINODULAR GOITER: ICD-10-CM

## 2024-10-02 ENCOUNTER — NON-APPOINTMENT (OUTPATIENT)
Age: 84
End: 2024-10-02

## 2025-01-28 ENCOUNTER — NON-APPOINTMENT (OUTPATIENT)
Age: 85
End: 2025-01-28

## 2025-01-28 ENCOUNTER — APPOINTMENT (OUTPATIENT)
Dept: OBGYN | Facility: CLINIC | Age: 85
End: 2025-01-28
Payer: MEDICARE

## 2025-01-28 VITALS
SYSTOLIC BLOOD PRESSURE: 141 MMHG | DIASTOLIC BLOOD PRESSURE: 81 MMHG | WEIGHT: 116 LBS | BODY MASS INDEX: 23.39 KG/M2 | HEART RATE: 67 BPM | HEIGHT: 59 IN

## 2025-01-28 DIAGNOSIS — Z78.9 OTHER SPECIFIED HEALTH STATUS: ICD-10-CM

## 2025-01-28 DIAGNOSIS — N85.9 NONINFLAMMATORY DISORDER OF UTERUS, UNSPECIFIED: ICD-10-CM

## 2025-01-28 PROCEDURE — 99213 OFFICE O/P EST LOW 20 MIN: CPT | Mod: 25

## 2025-01-28 PROCEDURE — 99459 PELVIC EXAMINATION: CPT

## 2025-01-28 PROCEDURE — 76830 TRANSVAGINAL US NON-OB: CPT

## 2025-04-10 ENCOUNTER — NON-APPOINTMENT (OUTPATIENT)
Age: 85
End: 2025-04-10

## 2025-06-21 ENCOUNTER — NON-APPOINTMENT (OUTPATIENT)
Age: 85
End: 2025-06-21

## 2025-07-15 ENCOUNTER — OUTPATIENT (OUTPATIENT)
Dept: OUTPATIENT SERVICES | Facility: HOSPITAL | Age: 85
LOS: 1 days | End: 2025-07-15
Payer: MEDICARE

## 2025-07-15 DIAGNOSIS — Z98.890 OTHER SPECIFIED POSTPROCEDURAL STATES: Chronic | ICD-10-CM

## 2025-07-15 DIAGNOSIS — E04.1 NONTOXIC SINGLE THYROID NODULE: ICD-10-CM

## 2025-07-15 DIAGNOSIS — Z00.8 ENCOUNTER FOR OTHER GENERAL EXAMINATION: ICD-10-CM

## 2025-07-15 DIAGNOSIS — Z90.10 ACQUIRED ABSENCE OF UNSPECIFIED BREAST AND NIPPLE: Chronic | ICD-10-CM

## 2025-07-15 PROCEDURE — 76536 US EXAM OF HEAD AND NECK: CPT

## 2025-07-15 PROCEDURE — 76536 US EXAM OF HEAD AND NECK: CPT | Mod: 26

## 2025-07-16 DIAGNOSIS — E04.1 NONTOXIC SINGLE THYROID NODULE: ICD-10-CM
